# Patient Record
Sex: FEMALE | URBAN - METROPOLITAN AREA
[De-identification: names, ages, dates, MRNs, and addresses within clinical notes are randomized per-mention and may not be internally consistent; named-entity substitution may affect disease eponyms.]

---

## 2023-04-17 ENCOUNTER — OFFICE VISIT (OUTPATIENT)
Dept: URBAN - METROPOLITAN AREA CLINIC 51 | Facility: CLINIC | Age: 78
End: 2023-04-17
Payer: OTHER GOVERNMENT

## 2023-04-17 DIAGNOSIS — H35.3122 NONEXUDATIVE MACULAR DEGENERATION, INTERMEDIATE DRY STAGE, LEFT EYE: ICD-10-CM

## 2023-04-17 DIAGNOSIS — H35.3114 NEXDTVE AGE-REL MCLR DEGN, R EYE, ADV ATRPC WITH SBFVL INVL: Primary | ICD-10-CM

## 2023-04-17 PROCEDURE — 92134 CPTRZ OPH DX IMG PST SGM RTA: CPT | Performed by: OPTOMETRIST

## 2023-04-17 PROCEDURE — 99204 OFFICE O/P NEW MOD 45 MIN: CPT | Performed by: OPTOMETRIST

## 2023-04-17 ASSESSMENT — INTRAOCULAR PRESSURE
OD: 16
OS: 19

## 2023-04-17 NOTE — IMPRESSION/PLAN
Impression: Nonexudative macular degeneration, intermediate dry stage, left eye: H35.8518.  Plan: monitor coffee

## 2023-04-17 NOTE — IMPRESSION/PLAN
Impression: Nexdtve age-rel mclr degn, r eye, adv atrpc with sbfvl invl: [id number]. Plan: refer to retina , h/o injections

## 2023-05-04 ENCOUNTER — OFFICE VISIT (OUTPATIENT)
Dept: URBAN - METROPOLITAN AREA CLINIC 44 | Facility: CLINIC | Age: 78
End: 2023-05-04
Payer: OTHER GOVERNMENT

## 2023-05-04 DIAGNOSIS — H43.813 VITREOUS DEGENERATION, BILATERAL: ICD-10-CM

## 2023-05-04 DIAGNOSIS — H35.3211 EXDTVE AGE-REL MCLR DEGN, RIGHT EYE, WITH ACTV CHRDL NEOVAS: Primary | ICD-10-CM

## 2023-05-04 DIAGNOSIS — H35.3122 NEXDTVE AGE-RELATED MCLR DEGN, LEFT EYE, INTERMED DRY STAGE: ICD-10-CM

## 2023-05-04 PROCEDURE — 67028 INJECTION EYE DRUG: CPT | Performed by: OPHTHALMOLOGY

## 2023-05-04 PROCEDURE — 92134 CPTRZ OPH DX IMG PST SGM RTA: CPT | Performed by: OPHTHALMOLOGY

## 2023-05-04 PROCEDURE — 99204 OFFICE O/P NEW MOD 45 MIN: CPT | Performed by: OPHTHALMOLOGY

## 2023-05-04 ASSESSMENT — INTRAOCULAR PRESSURE
OD: 21
OS: 19

## 2023-05-04 NOTE — IMPRESSION/PLAN
Impression: Exdtve age-rel mclr degn, right eye, with actv chrdl neovas: H35.3211. ***DEMENTIA*** Plan: eval and testing confirms CNV activity, mild SRF. hx of Eylea injections q4-8 weeks, last injection several months ago.  will repeat Eylea Sample OD today and return at same interval 

RTC 6-8 week ND Eylea OD  **obtain auth